# Patient Record
Sex: FEMALE | URBAN - METROPOLITAN AREA
[De-identification: names, ages, dates, MRNs, and addresses within clinical notes are randomized per-mention and may not be internally consistent; named-entity substitution may affect disease eponyms.]

---

## 2022-12-09 ENCOUNTER — OFFICE (OUTPATIENT)
Dept: URBAN - METROPOLITAN AREA CLINIC 105 | Facility: CLINIC | Age: 67
End: 2022-12-09
Payer: OTHER GOVERNMENT

## 2022-12-09 VITALS
HEART RATE: 72 BPM | DIASTOLIC BLOOD PRESSURE: 76 MMHG | WEIGHT: 246 LBS | SYSTOLIC BLOOD PRESSURE: 136 MMHG | HEIGHT: 70 IN | OXYGEN SATURATION: 96 %

## 2022-12-09 DIAGNOSIS — Z79.1 LONG TERM (CURRENT) USE OF NON-STEROIDAL ANTI-INFLAMMATORIES: ICD-10-CM

## 2022-12-09 DIAGNOSIS — K52.9 NONINFECTIVE GASTROENTERITIS AND COLITIS, UNSPECIFIED: ICD-10-CM

## 2022-12-09 DIAGNOSIS — K58.0 IRRITABLE BOWEL SYNDROME WITH DIARRHEA: ICD-10-CM

## 2022-12-09 DIAGNOSIS — K21.9 GASTRO-ESOPHAGEAL REFLUX DISEASE WITHOUT ESOPHAGITIS: ICD-10-CM

## 2022-12-09 DIAGNOSIS — R14.0 ABDOMINAL DISTENSION (GASEOUS): ICD-10-CM

## 2022-12-09 PROCEDURE — 99203 OFFICE O/P NEW LOW 30 MIN: CPT

## 2022-12-09 RX ORDER — SODIUM SULFATE, MAGNESIUM SULFATE, AND POTASSIUM CHLORIDE 17.75; 2.7; 2.25 G/1; G/1; G/1
TABLET ORAL
Qty: 1 | Refills: 0 | Status: COMPLETED
Start: 2022-12-09 | End: 2023-02-28

## 2022-12-09 NOTE — SERVICEHPINOTES
Elizabeth Vinson   is seen for an initial visit today. Patient presents for evaluation of chronic diarrhea and bloating. Patient reports having frequent gas and diarrhea some days. Her symptoms have pretty bad last year after COVID. Symptoms have recurred this year. She wonders if this is diet related. She is not taking anything for diarrhea. She reports having a colonoscopy 5 years ago with Dr. Burnette without polyps (report unavailable for review). She denies having abdominal pain. She denies blood in stool. Occasionally has dark stools. She takes omeprazole 40 mg daily for GERD with good control in her symptoms. She denies dysphagia. She also had an EGD many years ago with Dr. Burnette. She denies dysphagia. She previously had a cholecystectomy. She has a family history of colon cancer in her paternal grandmother. She denies celiac disease or inflammatory bowel disease. br
brReviewed labs from 9/2022. Patient with elevated triglycerides at 266. CMP with glucose 104 but otherwise normal. Hemoglobin A1c 5.6%. Free T4 normal. CBC with mild elevation hematocrit of 45.1 but otherwise normal. Vitamin D level normal. Patient was reportedly having diarrhea that was thought to be secondary to irritable bowel syndrome.

## 2022-12-09 NOTE — SERVICENOTES
– Ordered upper endoscopy and colonoscopy
– Ordered blood work
– Ordered stool studies
– Try loperamide (Imodium) as needed for diarrhea, no more than 8 pills in 24 hours; do not start taking this until you submit your stool studies

## 2022-12-28 ENCOUNTER — OFFICE (OUTPATIENT)
Dept: URBAN - METROPOLITAN AREA PATHOLOGY 24 | Facility: PATHOLOGY | Age: 67
End: 2022-12-28
Payer: OTHER GOVERNMENT

## 2022-12-28 ENCOUNTER — AMBULATORY SURGICAL CENTER (OUTPATIENT)
Dept: URBAN - METROPOLITAN AREA SURGERY 26 | Facility: SURGERY | Age: 67
End: 2022-12-28
Payer: OTHER GOVERNMENT

## 2022-12-28 DIAGNOSIS — K21.9 GASTRO-ESOPHAGEAL REFLUX DISEASE WITHOUT ESOPHAGITIS: ICD-10-CM

## 2022-12-28 DIAGNOSIS — K63.5 POLYP OF COLON: ICD-10-CM

## 2022-12-28 DIAGNOSIS — K31.7 POLYP OF STOMACH AND DUODENUM: ICD-10-CM

## 2022-12-28 DIAGNOSIS — R14.0 ABDOMINAL DISTENSION (GASEOUS): ICD-10-CM

## 2022-12-28 DIAGNOSIS — K29.50 UNSPECIFIED CHRONIC GASTRITIS WITHOUT BLEEDING: ICD-10-CM

## 2022-12-28 DIAGNOSIS — R19.7 DIARRHEA, UNSPECIFIED: ICD-10-CM

## 2022-12-28 PROCEDURE — 88313 SPECIAL STAINS GROUP 2: CPT | Performed by: PATHOLOGY

## 2022-12-28 PROCEDURE — 45380 COLONOSCOPY AND BIOPSY: CPT

## 2022-12-28 PROCEDURE — 88305 TISSUE EXAM BY PATHOLOGIST: CPT | Performed by: PATHOLOGY

## 2022-12-28 PROCEDURE — 88342 IMHCHEM/IMCYTCHM 1ST ANTB: CPT | Mod: 59 | Performed by: PATHOLOGY

## 2022-12-28 PROCEDURE — 43239 EGD BIOPSY SINGLE/MULTIPLE: CPT | Mod: 51

## 2023-02-28 ENCOUNTER — OFFICE (OUTPATIENT)
Dept: URBAN - METROPOLITAN AREA CLINIC 105 | Facility: CLINIC | Age: 68
End: 2023-02-28

## 2023-02-28 VITALS
SYSTOLIC BLOOD PRESSURE: 102 MMHG | WEIGHT: 251 LBS | OXYGEN SATURATION: 99 % | DIASTOLIC BLOOD PRESSURE: 50 MMHG | HEIGHT: 70 IN | HEART RATE: 70 BPM

## 2023-02-28 DIAGNOSIS — K52.9 NONINFECTIVE GASTROENTERITIS AND COLITIS, UNSPECIFIED: ICD-10-CM

## 2023-02-28 DIAGNOSIS — K21.9 GASTRO-ESOPHAGEAL REFLUX DISEASE WITHOUT ESOPHAGITIS: ICD-10-CM

## 2023-02-28 DIAGNOSIS — R14.0 ABDOMINAL DISTENSION (GASEOUS): ICD-10-CM

## 2023-02-28 DIAGNOSIS — K86.89 OTHER SPECIFIED DISEASES OF PANCREAS: ICD-10-CM

## 2023-02-28 PROCEDURE — 99214 OFFICE O/P EST MOD 30 MIN: CPT

## 2023-02-28 RX ORDER — PANCRELIPASE 36000; 180000; 114000 [USP'U]/1; [USP'U]/1; [USP'U]/1
CAPSULE, DELAYED RELEASE PELLETS ORAL
Qty: 240 | Refills: 11 | Status: ACTIVE
Start: 2023-02-28

## 2023-02-28 NOTE — SERVICENOTES
-Ordered blood work.
-Started Creon 36,000 lipase units, 2 capsules with meals and 1 with snacks.
-Ordered CT scan of abdomen/pelvis.
-Continue omeprazole 40 mg daily, 30-60 minutes before breakfast.
-Follow-up in 3 months.

## 2023-02-28 NOTE — SERVICEHPINOTES
Elizabeth Vinson   is seen today for a follow-up visit.  
br
brPatient is a 67-year-old woman who presents today for a follow-up of diarrhea. She denies any recent hospitalizations.brPt c/o chronic diarrhea. Patient reports her symptoms worsen in winter and improves in summer when she is eating more fresh foods. Patient unable to identify dietary triggers.brShe has history of GERD and is on omeprazole which has been helpful.brPatient has family history of colon cancer in paternal grandmother.brPatient does not drink alcohol. Does not smoke cigarette or use drugs.brDenies dysphagia.br   
Paige was seen in clinic 12/9/2022. Patient with chronic diarrhea and bloating. Symptoms worsened after having COVID. There was concern that her symptoms may have been diet related. She was taking omeprazole 40 mg daily for GERD. Patient was advised to try loperamide as needed for diarrhea. Celiac panel negative. CRP normal. Fecal fat and fatty acids elevated. Pancreatic elastase low at 111 suggestive of moderate pancreatic insufficiency. Fecal calprotectin elevated at 146. GI pathogen panel negative. At the time of her procedures she was still having diarrhea and had not tried loperamide. She had an EGD 12/28/2022 with 1 tongue of salmon-colored mucosa that was 2 cm in length (bxs w/ mild chronic inflammation, neg for Triplett's), multiple gastric polyps (benign cystic fundic gland polyps, mild antral erythema (bxs w/ mild reactive gastropathy, neg for H. pylori or GIM) and normal duodenum (bxs wnl, neg for celiac disease). She was advised to continue omeprazole 40 mg daily. She had a colonoscopy 12/28/2022 with normal terminal ileum, 2 diminutive polyps (benign lymphoid polyp and colon mucosa w/ mild reactive changes), sigmoid diverticulosis, internal/external hemorrhoids and otherwise normal-appearing colonic mucosa (random colon bxs wnl, neg for microscopic colitis). Repeat colonoscopy in 10 years for colon cancer screening (12/2032).

## 2023-03-30 LAB
HEMOGLOBIN A1C: 5.8 % — HIGH (ref 5.6–?)
PREALBUMIN: 30 MG/DL (ref 10–36)
PROTHROMBIN TIME (PT): INR: 1 % (ref 0.8–1.6)
PROTHROMBIN TIME (PT): PROTHROMBIN TIME: 14 SECONDS (ref 11.7–15)
VITAMIN A, SERUM: VITAMIN A: 68.2 UG/DL (ref 22–69.5)
VITAMIN B12: 909 PG/ML (ref 232–1245)
VITAMIN D, 25-HYDROXY: 50.3 NG/ML (ref 30–100)
VITAMIN E: VITAMIN E(ALPHA TOCOPHEROL): 14.4 MG/L (ref 9–29)
VITAMIN E: VITAMIN E(GAMMA TOCOPHEROL): 1.1 MG/L (ref 0.5–4.9)

## 2023-06-29 ENCOUNTER — OFFICE (OUTPATIENT)
Dept: URBAN - METROPOLITAN AREA CLINIC 105 | Facility: CLINIC | Age: 68
End: 2023-06-29
Payer: OTHER GOVERNMENT

## 2023-06-29 VITALS
HEIGHT: 70 IN | DIASTOLIC BLOOD PRESSURE: 62 MMHG | OXYGEN SATURATION: 98 % | SYSTOLIC BLOOD PRESSURE: 121 MMHG | WEIGHT: 247 LBS | HEART RATE: 81 BPM

## 2023-06-29 DIAGNOSIS — Z80.0 FAMILY HISTORY OF MALIGNANT NEOPLASM OF DIGESTIVE ORGANS: ICD-10-CM

## 2023-06-29 DIAGNOSIS — K86.89 OTHER SPECIFIED DISEASES OF PANCREAS: ICD-10-CM

## 2023-06-29 DIAGNOSIS — K52.9 NONINFECTIVE GASTROENTERITIS AND COLITIS, UNSPECIFIED: ICD-10-CM

## 2023-06-29 PROCEDURE — 99214 OFFICE O/P EST MOD 30 MIN: CPT

## 2023-06-29 RX ORDER — PANCRELIPASE LIPASE, PANCRELIPASE PROTEASE, PANCRELIPASE AMYLASE 40000; 126000; 168000 [USP'U]/1; [USP'U]/1; [USP'U]/1
CAPSULE, DELAYED RELEASE ORAL
Qty: 300 | Refills: 9 | Status: ACTIVE
Start: 2023-06-29